# Patient Record
Sex: MALE | Race: WHITE | Employment: FULL TIME | URBAN - METROPOLITAN AREA
[De-identification: names, ages, dates, MRNs, and addresses within clinical notes are randomized per-mention and may not be internally consistent; named-entity substitution may affect disease eponyms.]

---

## 2017-08-26 DIAGNOSIS — Z00.00 PREVENTATIVE HEALTH CARE: ICD-10-CM

## 2017-08-26 LAB
ANION GAP SERPL CALCULATED.3IONS-SCNC: 10 MMOL/L (ref 3–16)
BUN BLDV-MCNC: 20 MG/DL (ref 7–20)
CALCIUM SERPL-MCNC: 9 MG/DL (ref 8.3–10.6)
CHLORIDE BLD-SCNC: 105 MMOL/L (ref 99–110)
CHOLESTEROL, TOTAL: 177 MG/DL (ref 0–199)
CO2: 26 MMOL/L (ref 21–32)
CREAT SERPL-MCNC: 1.1 MG/DL (ref 0.9–1.3)
GFR AFRICAN AMERICAN: >60
GFR NON-AFRICAN AMERICAN: >60
GLUCOSE BLD-MCNC: 86 MG/DL (ref 70–99)
HDLC SERPL-MCNC: 47 MG/DL (ref 40–60)
LDL CHOLESTEROL CALCULATED: 112 MG/DL
POTASSIUM SERPL-SCNC: 4.7 MMOL/L (ref 3.5–5.1)
PROSTATE SPECIFIC ANTIGEN: 1.03 NG/ML (ref 0–4)
SODIUM BLD-SCNC: 141 MMOL/L (ref 136–145)
TRIGL SERPL-MCNC: 92 MG/DL (ref 0–150)
VLDLC SERPL CALC-MCNC: 18 MG/DL

## 2017-09-06 ENCOUNTER — TELEPHONE (OUTPATIENT)
Dept: FAMILY MEDICINE CLINIC | Age: 47
End: 2017-09-06

## 2017-09-06 DIAGNOSIS — E78.00 HYPERCHOLESTEROLEMIA: ICD-10-CM

## 2017-09-06 DIAGNOSIS — Z00.00 ROUTINE GENERAL MEDICAL EXAMINATION AT A HEALTH CARE FACILITY: ICD-10-CM

## 2017-09-06 DIAGNOSIS — Z11.4 SCREENING FOR HIV (HUMAN IMMUNODEFICIENCY VIRUS): Primary | ICD-10-CM

## 2017-09-06 RX ORDER — LISINOPRIL 10 MG/1
TABLET ORAL
Qty: 30 TABLET | Refills: 0 | Status: SHIPPED | OUTPATIENT
Start: 2017-09-06 | End: 2017-09-08 | Stop reason: SDUPTHER

## 2017-09-08 ENCOUNTER — TELEPHONE (OUTPATIENT)
Dept: FAMILY MEDICINE CLINIC | Age: 47
End: 2017-09-08

## 2017-09-08 ENCOUNTER — OFFICE VISIT (OUTPATIENT)
Dept: FAMILY MEDICINE CLINIC | Age: 47
End: 2017-09-08

## 2017-09-08 VITALS
HEIGHT: 69 IN | HEART RATE: 81 BPM | DIASTOLIC BLOOD PRESSURE: 70 MMHG | WEIGHT: 164 LBS | TEMPERATURE: 98.2 F | BODY MASS INDEX: 24.29 KG/M2 | SYSTOLIC BLOOD PRESSURE: 110 MMHG

## 2017-09-08 DIAGNOSIS — Z00.00 PREVENTATIVE HEALTH CARE: Primary | ICD-10-CM

## 2017-09-08 DIAGNOSIS — Z23 NEED FOR INFLUENZA VACCINATION: ICD-10-CM

## 2017-09-08 DIAGNOSIS — E78.5 DYSLIPIDEMIA: ICD-10-CM

## 2017-09-08 DIAGNOSIS — K62.5 BRIGHT RED RECTAL BLEEDING: ICD-10-CM

## 2017-09-08 DIAGNOSIS — I10 ESSENTIAL HYPERTENSION: ICD-10-CM

## 2017-09-08 PROCEDURE — 99396 PREV VISIT EST AGE 40-64: CPT | Performed by: FAMILY MEDICINE

## 2017-09-08 PROCEDURE — 90471 IMMUNIZATION ADMIN: CPT | Performed by: FAMILY MEDICINE

## 2017-09-08 PROCEDURE — 90630 INFLUENZA, QUADV, 18-64 YRS, ID, PF, MICRO INJ, 0.1ML (FLUZONE QUADV, PF): CPT | Performed by: FAMILY MEDICINE

## 2017-09-08 RX ORDER — LISINOPRIL 10 MG/1
TABLET ORAL
Qty: 90 TABLET | Refills: 3 | Status: SHIPPED | OUTPATIENT
Start: 2017-09-08 | End: 2018-11-08 | Stop reason: SDUPTHER

## 2017-09-08 RX ORDER — SERTRALINE HYDROCHLORIDE 100 MG/1
100 TABLET, FILM COATED ORAL DAILY
Qty: 90 TABLET | Refills: 3 | Status: SHIPPED | OUTPATIENT
Start: 2017-09-08 | End: 2018-05-20 | Stop reason: SDUPTHER

## 2017-09-18 ASSESSMENT — ENCOUNTER SYMPTOMS
ROS SKIN COMMENTS: NO SKIN WOUNDS.
BLOOD IN STOOL: 0
SHORTNESS OF BREATH: 0
ABDOMINAL PAIN: 0

## 2017-09-26 ENCOUNTER — TELEPHONE (OUTPATIENT)
Dept: FAMILY MEDICINE CLINIC | Age: 47
End: 2017-09-26

## 2017-10-25 ENCOUNTER — OFFICE VISIT (OUTPATIENT)
Dept: FAMILY MEDICINE CLINIC | Age: 47
End: 2017-10-25

## 2017-10-25 VITALS
HEART RATE: 72 BPM | TEMPERATURE: 98 F | SYSTOLIC BLOOD PRESSURE: 118 MMHG | HEIGHT: 69 IN | BODY MASS INDEX: 25.21 KG/M2 | DIASTOLIC BLOOD PRESSURE: 82 MMHG | WEIGHT: 170.2 LBS

## 2017-10-25 DIAGNOSIS — J02.9 SORE THROAT: Primary | ICD-10-CM

## 2017-10-25 LAB — S PYO AG THROAT QL: NORMAL

## 2017-10-25 PROCEDURE — 87880 STREP A ASSAY W/OPTIC: CPT | Performed by: FAMILY MEDICINE

## 2017-10-25 PROCEDURE — 99213 OFFICE O/P EST LOW 20 MIN: CPT | Performed by: FAMILY MEDICINE

## 2017-10-25 ASSESSMENT — ENCOUNTER SYMPTOMS
VOMITING: 0
DIARRHEA: 0
COUGH: 1
NAUSEA: 0
SHORTNESS OF BREATH: 0
SORE THROAT: 1
ABDOMINAL PAIN: 0
RHINORRHEA: 1

## 2017-10-25 NOTE — PATIENT INSTRUCTIONS
Please call your pharmacy if you need any refills of your medication(s). Please call our office at [phone] if you don't hear from us about your test results. Bring an accurate list of your medications with you at every appointment to ensure that we have the correct information. Our office hours are: Monday - Friday 7 am- 5 pm; Saturdays vary on doctor working.     Phone lines turn on at 8 am.

## 2017-10-25 NOTE — PROGRESS NOTES
Subjective:      Patient ID: Zach Elliott is a 52 y.o. male. HPI     Chief Complaint   Patient presents with    Sinusitis     dry cough, nasal congestion, sinus pressure, headaches intermitted, and sore throat x 1 week. has taken ibu. Pt presents c/o sore throat x 1 week. States he noticed some white spots associated with his sore throat that resolved a few days later. Associated with dry cough, sinus drainage, headache, fatigue, and intermittent ear pain. States he has tried salt water gargles a few times a day and Ibuprofen as needed with some relief. Denies recent sick contacts but states that he works with different people. Pt states he is getting a colonoscopy next Monday and just wanted to be sure he was not sick in case he had to reschedule his colonoscopy. Zach Elliott is a 52 y.o. male with the following history as recorded in [facility]:  Patient Active Problem List    Diagnosis Date Noted    Essential hypertension 09/08/2017    Dyslipidemia 02/06/2014    Hypercholesterolemia 11/07/2011    Hyperglycemia 11/07/2011    Hypertension     Anxiety      Current Outpatient Prescriptions   Medication Sig Dispense Refill    lisinopril (PRINIVIL;ZESTRIL) 10 MG tablet TAKE 1 TABLET BY MOUTH EVERY DAY 90 tablet 3    sertraline (ZOLOFT) 100 MG tablet Take 1 tablet by mouth daily 90 tablet 3     No current facility-administered medications for this visit. Allergies: Review of patient's allergies indicates no known allergies.   Past Medical History:   Diagnosis Date    Anxiety     Hypertension      Past Surgical History:   Procedure Laterality Date    HERNIA REPAIR       Family History   Problem Relation Age of Onset    High Blood Pressure Mother     High Blood Pressure Father      Social History   Substance Use Topics    Smoking status: Never Smoker    Smokeless tobacco: Never Used    Alcohol use No     Vitals:    10/25/17 1137   BP: 118/82   Pulse: 72   Temp: 98 °F (36.7 °C) TempSrc: Oral   Weight: 170 lb 3.2 oz (77.2 kg)   Height: 5' 9\" (1.753 m)     Body mass index is 25.13 kg/m². Wt Readings from Last 3 Encounters:   10/25/17 170 lb 3.2 oz (77.2 kg)   09/08/17 164 lb (74.4 kg)   09/02/16 154 lb (69.9 kg)     BP Readings from Last 3 Encounters:   10/25/17 118/82   09/08/17 110/70   09/02/16 100/66      Review of Systems   Constitutional: Positive for fatigue. Negative for chills and fever. HENT: Positive for congestion, ear pain, rhinorrhea and sore throat. Respiratory: Positive for cough. Negative for shortness of breath. Cardiovascular: Negative for chest pain and palpitations. Gastrointestinal: Negative for abdominal pain, diarrhea, nausea and vomiting. Skin: Negative for rash. Neurological: Positive for headaches. Objective:   Physical Exam   Constitutional: He is oriented to person, place, and time. He appears well-developed and well-nourished. HENT:   Head: Normocephalic and atraumatic. Right Ear: Tympanic membrane, external ear and ear canal normal.   Left Ear: Tympanic membrane, external ear and ear canal normal.   Nose: Right sinus exhibits no maxillary sinus tenderness and no frontal sinus tenderness. Left sinus exhibits no maxillary sinus tenderness and no frontal sinus tenderness. Mouth/Throat: Mucous membranes are normal. No oropharyngeal exudate, posterior oropharyngeal erythema or tonsillar abscesses. Cardiovascular: Normal rate and regular rhythm. Pulmonary/Chest: Effort normal and breath sounds normal.   Abdominal: Soft. Bowel sounds are normal.   Neurological: He is alert and oriented to person, place, and time. Skin: Skin is warm and dry. Nursing note and vitals reviewed.     Results for POC orders placed in visit on 10/25/17   POCT rapid strep A   Result Value Ref Range    Strep A Ag None Detected None Detected         Assessment:      Sore throat - Rapid strep was negative in clinic - will send for culture      Plan: Informed pt to use humidifier, ibuprofen/tylenol PRN, continue salt water gargles, and drink soothing liquids  Informed pt that the office will call with culture results    Orders Placed This Encounter   Procedures    THROAT CULTURE    POCT rapid strep A

## 2017-10-27 LAB — THROAT CULTURE: NORMAL

## 2018-05-23 RX ORDER — SERTRALINE HYDROCHLORIDE 100 MG/1
100 TABLET, FILM COATED ORAL DAILY
Qty: 90 TABLET | Refills: 1 | Status: SHIPPED | OUTPATIENT
Start: 2018-05-23 | End: 2018-11-08 | Stop reason: SDUPTHER

## 2018-08-25 DIAGNOSIS — Z00.00 PREVENTATIVE HEALTH CARE: ICD-10-CM

## 2018-08-25 LAB
ANION GAP SERPL CALCULATED.3IONS-SCNC: 14 MMOL/L (ref 3–16)
BUN BLDV-MCNC: 20 MG/DL (ref 7–20)
CALCIUM SERPL-MCNC: 9.5 MG/DL (ref 8.3–10.6)
CHLORIDE BLD-SCNC: 102 MMOL/L (ref 99–110)
CHOLESTEROL, TOTAL: 213 MG/DL (ref 0–199)
CO2: 22 MMOL/L (ref 21–32)
CREAT SERPL-MCNC: 1.2 MG/DL (ref 0.9–1.3)
GFR AFRICAN AMERICAN: >60
GFR NON-AFRICAN AMERICAN: >60
GLUCOSE BLD-MCNC: 110 MG/DL (ref 70–99)
HDLC SERPL-MCNC: 42 MG/DL (ref 40–60)
LDL CHOLESTEROL CALCULATED: 156 MG/DL
POTASSIUM SERPL-SCNC: 4.7 MMOL/L (ref 3.5–5.1)
SODIUM BLD-SCNC: 138 MMOL/L (ref 136–145)
TRIGL SERPL-MCNC: 73 MG/DL (ref 0–150)
VLDLC SERPL CALC-MCNC: 15 MG/DL

## 2018-08-26 LAB
HIV AG/AB: NORMAL
HIV ANTIGEN: NORMAL
HIV-1 ANTIBODY: NORMAL
HIV-2 AB: NORMAL

## 2018-09-06 DIAGNOSIS — R73.9 HYPERGLYCEMIA: Primary | ICD-10-CM

## 2018-09-22 DIAGNOSIS — R73.9 HYPERGLYCEMIA: ICD-10-CM

## 2018-09-23 LAB
ESTIMATED AVERAGE GLUCOSE: 108.3 MG/DL
HBA1C MFR BLD: 5.4 %

## 2018-11-08 ENCOUNTER — OFFICE VISIT (OUTPATIENT)
Dept: FAMILY MEDICINE CLINIC | Age: 48
End: 2018-11-08
Payer: COMMERCIAL

## 2018-11-08 VITALS
BODY MASS INDEX: 27.02 KG/M2 | DIASTOLIC BLOOD PRESSURE: 70 MMHG | WEIGHT: 182.4 LBS | TEMPERATURE: 98.7 F | SYSTOLIC BLOOD PRESSURE: 110 MMHG | HEIGHT: 69 IN

## 2018-11-08 DIAGNOSIS — L98.9 FACIAL LESION: ICD-10-CM

## 2018-11-08 DIAGNOSIS — Z00.00 PREVENTATIVE HEALTH CARE: Primary | ICD-10-CM

## 2018-11-08 DIAGNOSIS — Z23 NEED FOR INFLUENZA VACCINATION: ICD-10-CM

## 2018-11-08 PROCEDURE — 99212 OFFICE O/P EST SF 10 MIN: CPT | Performed by: FAMILY MEDICINE

## 2018-11-08 PROCEDURE — 90471 IMMUNIZATION ADMIN: CPT | Performed by: FAMILY MEDICINE

## 2018-11-08 PROCEDURE — 90688 IIV4 VACCINE SPLT 0.5 ML IM: CPT | Performed by: FAMILY MEDICINE

## 2018-11-08 PROCEDURE — 99396 PREV VISIT EST AGE 40-64: CPT | Performed by: FAMILY MEDICINE

## 2018-11-08 RX ORDER — LISINOPRIL 10 MG/1
TABLET ORAL
Qty: 90 TABLET | Refills: 3 | Status: SHIPPED | OUTPATIENT
Start: 2018-11-08 | End: 2019-11-07 | Stop reason: SDUPTHER

## 2018-11-08 RX ORDER — SERTRALINE HYDROCHLORIDE 100 MG/1
100 TABLET, FILM COATED ORAL DAILY
Qty: 90 TABLET | Refills: 3 | Status: SHIPPED | OUTPATIENT
Start: 2018-11-08 | End: 2019-08-23

## 2018-11-08 ASSESSMENT — PATIENT HEALTH QUESTIONNAIRE - PHQ9
SUM OF ALL RESPONSES TO PHQ QUESTIONS 1-9: 0
2. FEELING DOWN, DEPRESSED OR HOPELESS: 0
1. LITTLE INTEREST OR PLEASURE IN DOING THINGS: 0
SUM OF ALL RESPONSES TO PHQ QUESTIONS 1-9: 0
SUM OF ALL RESPONSES TO PHQ9 QUESTIONS 1 & 2: 0

## 2018-11-08 ASSESSMENT — ENCOUNTER SYMPTOMS
SHORTNESS OF BREATH: 0
NAUSEA: 0
WHEEZING: 0
BLOOD IN STOOL: 0

## 2018-11-08 NOTE — PROGRESS NOTES
11/8/2018    Roseann Fowler is a 50 y.o. male    Chief Complaint   Patient presents with    Annual Exam     CPE    has a spot on nose       SUBJECTIVE  HPI      Roseann Fowler is a 50year old male who presented to the clinic for his annual physical exam. We discussed labs and a facial lesion on his nose that he noticed 1 year ago. He states that it has not changed, does not bleed, and is not painful. He has not tried anything to make it go away. Review of Systems   Constitutional: Negative for fatigue and fever. Eyes: Negative for visual disturbance. Respiratory: Negative for shortness of breath and wheezing. Cardiovascular: Negative for chest pain. Gastrointestinal: Negative for blood in stool and nausea. Endocrine: Negative for polyuria. Genitourinary: Negative for hematuria. Neurological: Negative for dizziness. Psychiatric/Behavioral: Negative for confusion. Patient Active Problem List   Diagnosis    Hypertension    Anxiety    Hypercholesterolemia    Hyperglycemia    Dyslipidemia    Essential hypertension      Family History   Problem Relation Age of Onset    High Blood Pressure Mother     High Blood Pressure Father      Past Surgical History:   Procedure Laterality Date    HERNIA REPAIR       Social History     Social History    Marital status:      Spouse name: N/A    Number of children: N/A    Years of education: N/A     Occupational History    Not on file. Social History Main Topics    Smoking status: Never Smoker    Smokeless tobacco: Never Used    Alcohol use No    Drug use: No    Sexual activity: Not on file     Other Topics Concern    Not on file     Social History Narrative    No narrative on file         OBJECTIVE  Physical Exam   Constitutional: He is oriented to person, place, and time. He appears well-developed and well-nourished. HENT:   Head: Normocephalic and atraumatic.    Cardiovascular: Normal rate, regular rhythm and normal heart sounds. Pulmonary/Chest: Effort normal and breath sounds normal. He has no wheezes. Abdominal: Soft. Bowel sounds are normal. He exhibits no distension. There is no tenderness. Musculoskeletal: Normal range of motion. Lymphadenopathy:     He has no cervical adenopathy. Neurological: He is alert and oriented to person, place, and time. Skin: Skin is warm. Flat red skin lesion noted on the tip of his nose. Psychiatric: He has a normal mood and affect. The 10-year ASCVD risk score (Bill Hernandez et al., 2013) is: 3.4%    Values used to calculate the score:      Age: 50 years      Sex: Male      Is Non- : No      Diabetic: No      Tobacco smoker: No      Systolic Blood Pressure: 569 mmHg      Is BP treated: Yes      HDL Cholesterol: 42 mg/dL      Total Cholesterol: 213 mg/dL    Allergies  Patient has no known allergies. Current Outpatient Prescriptions   Medication Sig Dispense Refill    sertraline (ZOLOFT) 100 MG tablet Take 1 tablet by mouth daily 90 tablet 3    lisinopril (PRINIVIL;ZESTRIL) 10 MG tablet TAKE 1 TABLET BY MOUTH EVERY DAY 90 tablet 3     No current facility-administered medications for this visit. Vitals:    11/08/18 0709   BP: 110/70   Temp: 98.7 °F (37.1 °C)     Body mass index is 26.94 kg/m². Lab Review   Orders Only on 09/22/2018   Component Date Value    Hemoglobin A1C 09/22/2018 5.4     eAG 09/22/2018 108.3            ASSESSMENT AND PLAN     Diagnosis Orders   1. Preventative health care  Psa screening    Basic Metabolic Panel    Lipid Panel   2. Facial lesion  Ambulatory referral to Dermatology   3. Need for influenza vaccination  INFLUENZA, QUADV, 3 YRS AND OLDER, IM, MDV, 0.5ML ([facility])   4. BMI 26.0-26.9,adult       Dermatology referral for evaluation of nasal lesion. Continue with exercise.   New meds this visit:    Orders Placed This Encounter   Medications    sertraline (ZOLOFT) 100 MG tablet     Sig: Take 1 tablet by mouth daily     Dispense:  90 tablet     Refill:  3    lisinopril (PRINIVIL;ZESTRIL) 10 MG tablet     Sig: TAKE 1 TABLET BY MOUTH EVERY DAY     Dispense:  90 tablet     Refill:  3     PATIENT NEEDS ASAP PLEASE     New orders placed this visit:    Orders Placed This Encounter   Procedures    INFLUENZA, QUADV, 3 YRS AND OLDER, IM, MDV, 0.5ML (FLUZONE QUADV)    Psa screening     Standing Status:   Future     Standing Expiration Date:   2/8/2020    Basic Metabolic Panel     Standing Status:   Future     Standing Expiration Date:   2/8/2020    Lipid Panel     Standing Status:   Future     Standing Expiration Date:   2/8/2020     Order Specific Question:   Is Patient Fasting?/# of Hours     Answer:   12 hours    Ambulatory referral to Dermatology     Referral Priority:   Routine     Referral Type:   Consult for Advice and Opinion     Referral Reason:   Specialty Services Required     Referred to Provider:   Chai Stephens MD     Number of Visits Requested:   1     Return in about 1 year (around 11/8/2019) for 30 minute annual physical.  continue with the following meds:    Outpatient Encounter Prescriptions as of 11/8/2018   Medication Sig Dispense Refill    sertraline (ZOLOFT) 100 MG tablet Take 1 tablet by mouth daily 90 tablet 3    lisinopril (PRINIVIL;ZESTRIL) 10 MG tablet TAKE 1 TABLET BY MOUTH EVERY DAY 90 tablet 3    [DISCONTINUED] sertraline (ZOLOFT) 100 MG tablet TAKE 1 TABLET BY MOUTH DAILY 90 tablet 1    [DISCONTINUED] lisinopril (PRINIVIL;ZESTRIL) 10 MG tablet TAKE 1 TABLET BY MOUTH EVERY DAY 90 tablet 3     No facility-administered encounter medications on file as of 11/8/2018. Flu shot administered. He denies any need for tdap as he does not work outside and isn't at risk of getting any dirty wounds. We discussed that his A1C level was normal and there were no concerns based on his current ASCVD score.      He would like his PSA checked again next year because his father has a history of prostate problems. We talked about his recent weight gain. He stated that he is aware that he has gained some weight and plans on increasing his daily exercise. He will make an appointment with dermatology to evaluate the lesion on his nose.          Alcides Bauer D.O.

## 2018-11-08 NOTE — PATIENT INSTRUCTIONS
Please call your pharmacy if you need any refills of your medication(s). Please call our office at [phone] if you don't hear from us about your test results. Please be sure to call our office if your illness/problem has been treated for but has not completely resolved. Bring an accurate list of your medications with you at every appointment to ensure that we have the correct information. Our office hours are: Monday - Friday 7 am- 5 pm; Saturdays vary on doctor working.     Phone lines turn on at 8 am.

## 2018-11-14 RX ORDER — LISINOPRIL 10 MG/1
TABLET ORAL
Qty: 90 TABLET | Refills: 3 | OUTPATIENT
Start: 2018-11-14

## 2019-01-29 ENCOUNTER — OFFICE VISIT (OUTPATIENT)
Dept: DERMATOLOGY | Age: 49
End: 2019-01-29
Payer: COMMERCIAL

## 2019-01-29 DIAGNOSIS — L57.0 ACTINIC KERATOSIS: Primary | ICD-10-CM

## 2019-01-29 DIAGNOSIS — D22.9 MULTIPLE BENIGN MELANOCYTIC NEVI: ICD-10-CM

## 2019-01-29 DIAGNOSIS — Z12.83 SKIN CANCER SCREENING: ICD-10-CM

## 2019-01-29 PROCEDURE — 17000 DESTRUCT PREMALG LESION: CPT | Performed by: DERMATOLOGY

## 2019-01-29 PROCEDURE — 99203 OFFICE O/P NEW LOW 30 MIN: CPT | Performed by: DERMATOLOGY

## 2019-02-28 PROBLEM — Z12.83 SKIN CANCER SCREENING: Status: RESOLVED | Noted: 2019-01-29 | Resolved: 2019-02-28

## 2019-08-23 ENCOUNTER — OFFICE VISIT (OUTPATIENT)
Dept: FAMILY MEDICINE CLINIC | Age: 49
End: 2019-08-23
Payer: COMMERCIAL

## 2019-08-23 VITALS
TEMPERATURE: 99.6 F | BODY MASS INDEX: 28.02 KG/M2 | SYSTOLIC BLOOD PRESSURE: 110 MMHG | DIASTOLIC BLOOD PRESSURE: 74 MMHG | WEIGHT: 189.2 LBS | HEIGHT: 69 IN

## 2019-08-23 DIAGNOSIS — H65.03 BILATERAL ACUTE SEROUS OTITIS MEDIA, RECURRENCE NOT SPECIFIED: Primary | ICD-10-CM

## 2019-08-23 PROCEDURE — 99213 OFFICE O/P EST LOW 20 MIN: CPT | Performed by: INTERNAL MEDICINE

## 2019-08-23 RX ORDER — CEFUROXIME AXETIL 250 MG/1
250 TABLET ORAL 2 TIMES DAILY
Qty: 20 TABLET | Refills: 0 | Status: SHIPPED | OUTPATIENT
Start: 2019-08-23 | End: 2019-09-02

## 2019-08-23 ASSESSMENT — PATIENT HEALTH QUESTIONNAIRE - PHQ9
1. LITTLE INTEREST OR PLEASURE IN DOING THINGS: 0
SUM OF ALL RESPONSES TO PHQ QUESTIONS 1-9: 0
SUM OF ALL RESPONSES TO PHQ9 QUESTIONS 1 & 2: 0
SUM OF ALL RESPONSES TO PHQ QUESTIONS 1-9: 0
2. FEELING DOWN, DEPRESSED OR HOPELESS: 0

## 2019-08-23 ASSESSMENT — ENCOUNTER SYMPTOMS
SHORTNESS OF BREATH: 0
ANAL BLEEDING: 0
APNEA: 0
CONSTIPATION: 0
ABDOMINAL PAIN: 0
COUGH: 0
BLOOD IN STOOL: 0

## 2019-09-28 DIAGNOSIS — Z00.00 PREVENTATIVE HEALTH CARE: ICD-10-CM

## 2019-09-28 LAB
ANION GAP SERPL CALCULATED.3IONS-SCNC: 10 MMOL/L (ref 3–16)
BUN BLDV-MCNC: 17 MG/DL (ref 7–20)
CALCIUM SERPL-MCNC: 9 MG/DL (ref 8.3–10.6)
CHLORIDE BLD-SCNC: 105 MMOL/L (ref 99–110)
CHOLESTEROL, TOTAL: 180 MG/DL (ref 0–199)
CO2: 25 MMOL/L (ref 21–32)
CREAT SERPL-MCNC: 1.1 MG/DL (ref 0.9–1.3)
GFR AFRICAN AMERICAN: >60
GFR NON-AFRICAN AMERICAN: >60
GLUCOSE BLD-MCNC: 92 MG/DL (ref 70–99)
HDLC SERPL-MCNC: 39 MG/DL (ref 40–60)
LDL CHOLESTEROL CALCULATED: 120 MG/DL
POTASSIUM SERPL-SCNC: 4.3 MMOL/L (ref 3.5–5.1)
PROSTATE SPECIFIC ANTIGEN: 1.09 NG/ML (ref 0–4)
SODIUM BLD-SCNC: 140 MMOL/L (ref 136–145)
TRIGL SERPL-MCNC: 104 MG/DL (ref 0–150)
VLDLC SERPL CALC-MCNC: 21 MG/DL

## 2019-11-07 ENCOUNTER — OFFICE VISIT (OUTPATIENT)
Dept: FAMILY MEDICINE CLINIC | Age: 49
End: 2019-11-07
Payer: COMMERCIAL

## 2019-11-07 VITALS
DIASTOLIC BLOOD PRESSURE: 80 MMHG | BODY MASS INDEX: 28.44 KG/M2 | WEIGHT: 192 LBS | HEIGHT: 69 IN | SYSTOLIC BLOOD PRESSURE: 120 MMHG | TEMPERATURE: 98.3 F

## 2019-11-07 DIAGNOSIS — Z00.00 PREVENTATIVE HEALTH CARE: Primary | ICD-10-CM

## 2019-11-07 DIAGNOSIS — Z23 NEED FOR INFLUENZA VACCINATION: ICD-10-CM

## 2019-11-07 DIAGNOSIS — R06.83 LOUD SNORING: ICD-10-CM

## 2019-11-07 DIAGNOSIS — E78.00 HYPERCHOLESTEROLEMIA: ICD-10-CM

## 2019-11-07 DIAGNOSIS — G47.20 NIGHT-WAKING DISORDER: ICD-10-CM

## 2019-11-07 DIAGNOSIS — I10 ESSENTIAL HYPERTENSION: ICD-10-CM

## 2019-11-07 DIAGNOSIS — Z23 NEEDS FLU SHOT: ICD-10-CM

## 2019-11-07 PROCEDURE — 90686 IIV4 VACC NO PRSV 0.5 ML IM: CPT | Performed by: FAMILY MEDICINE

## 2019-11-07 PROCEDURE — 90471 IMMUNIZATION ADMIN: CPT | Performed by: FAMILY MEDICINE

## 2019-11-07 PROCEDURE — 99396 PREV VISIT EST AGE 40-64: CPT | Performed by: FAMILY MEDICINE

## 2019-11-07 RX ORDER — LISINOPRIL 10 MG/1
TABLET ORAL
Qty: 90 TABLET | Refills: 3 | Status: SHIPPED | OUTPATIENT
Start: 2019-11-07 | End: 2020-07-16

## 2019-11-07 ASSESSMENT — ENCOUNTER SYMPTOMS
CHEST TIGHTNESS: 0
CONSTIPATION: 0
BLOOD IN STOOL: 0
COUGH: 1
DIARRHEA: 0

## 2020-01-09 ENCOUNTER — OFFICE VISIT (OUTPATIENT)
Dept: PULMONOLOGY | Age: 50
End: 2020-01-09
Payer: COMMERCIAL

## 2020-01-09 VITALS
HEIGHT: 69 IN | DIASTOLIC BLOOD PRESSURE: 80 MMHG | WEIGHT: 186 LBS | HEART RATE: 75 BPM | SYSTOLIC BLOOD PRESSURE: 110 MMHG | BODY MASS INDEX: 27.55 KG/M2 | OXYGEN SATURATION: 98 %

## 2020-01-09 PROBLEM — I10 ESSENTIAL HYPERTENSION: Chronic | Status: ACTIVE | Noted: 2017-09-08

## 2020-01-09 PROCEDURE — 99244 OFF/OP CNSLTJ NEW/EST MOD 40: CPT | Performed by: INTERNAL MEDICINE

## 2020-01-09 ASSESSMENT — ENCOUNTER SYMPTOMS
PHOTOPHOBIA: 0
COUGH: 1
ALLERGIC/IMMUNOLOGIC NEGATIVE: 1
VOMITING: 0
EYE PAIN: 0
RHINORRHEA: 0
ABDOMINAL PAIN: 0
ABDOMINAL DISTENTION: 0
APNEA: 0
NAUSEA: 0
SHORTNESS OF BREATH: 0
CHEST TIGHTNESS: 0

## 2020-01-09 ASSESSMENT — SLEEP AND FATIGUE QUESTIONNAIRES
HOW LIKELY ARE YOU TO NOD OFF OR FALL ASLEEP WHILE SITTING QUIETLY AFTER LUNCH WITHOUT ALCOHOL: 1
HOW LIKELY ARE YOU TO NOD OFF OR FALL ASLEEP WHILE SITTING AND READING: 2
NECK CIRCUMFERENCE (INCHES): 16
HOW LIKELY ARE YOU TO NOD OFF OR FALL ASLEEP WHEN YOU ARE A PASSENGER IN A CAR FOR AN HOUR WITHOUT A BREAK: 0
HOW LIKELY ARE YOU TO NOD OFF OR FALL ASLEEP IN A CAR, WHILE STOPPED FOR A FEW MINUTES IN TRAFFIC: 0
HOW LIKELY ARE YOU TO NOD OFF OR FALL ASLEEP WHILE SITTING AND TALKING TO SOMEONE: 0
HOW LIKELY ARE YOU TO NOD OFF OR FALL ASLEEP WHILE LYING DOWN TO REST IN THE AFTERNOON WHEN CIRCUMSTANCES PERMIT: 1
ESS TOTAL SCORE: 6
HOW LIKELY ARE YOU TO NOD OFF OR FALL ASLEEP WHILE WATCHING TV: 2
HOW LIKELY ARE YOU TO NOD OFF OR FALL ASLEEP WHILE SITTING INACTIVE IN A PUBLIC PLACE: 0

## 2020-01-09 NOTE — PROGRESS NOTES
night.  He denies: cataplexy and hypnagogic hallucinations. Symptoms began 6 years ago, gradually worsening since that time. HTN: stable on meds and followed by pt's PCP and other physicians. Previous evaluation and treatment has included- none    DOT/CDL - No  FAA/'s license -No    Previous Report(s) Reviewed: historical medical records, office notes, andreferral letter(s). Pertinent data has been documented.     [place] - Total score: 6    Caffeine Intake - Coffee: 2 cup(s) per day    Social History     Socioeconomic History    Marital status:      Spouse name: Not on file    Number of children: Not on file    Years of education: Not on file    Highest education level: Not on file   Occupational History    Not on file   Social Needs    Financial resource strain: Not on file    Food insecurity:     Worry: Not on file     Inability: Not on file    Transportation needs:     Medical: Not on file     Non-medical: Not on file   Tobacco Use    Smoking status: Never Smoker    Smokeless tobacco: Never Used   Substance and Sexual Activity    Alcohol use: No    Drug use: No    Sexual activity: Not on file   Lifestyle    Physical activity:     Days per week: Not on file     Minutes per session: Not on file    Stress: Not on file   Relationships    Social connections:     Talks on phone: Not on file     Gets together: Not on file     Attends Islam service: Not on file     Active member of club or organization: Not on file     Attends meetings of clubs or organizations: Not on file     Relationship status: Not on file    Intimate partner violence:     Fear of current or ex partner: Not on file     Emotionally abused: Not on file     Physically abused: Not on file     Forced sexual activity: Not on file   Other Topics Concern    Not on file   Social History Narrative    Not on file        Current Outpatient Medications   Medication Sig Dispense Refill    lisinopril (PRINIVIL;ZESTRIL) 10 MG tablet TAKE 1 TABLET BY MOUTH EVERY DAY 90 tablet 3     No current facility-administered medications for this visit. Allergies as of 01/09/2020    (No Known Allergies)       Patient Active Problem List   Diagnosis    Anxiety    Hypercholesterolemia    Hyperglycemia    Dyslipidemia    Essential hypertension    Actinic keratosis    Multiple benign melanocytic nevi       Past Medical History:   Diagnosis Date    Anxiety     Hypertension        Past Surgical History:   Procedure Laterality Date    HERNIA REPAIR         Family History   Problem Relation Age of Onset    High Blood Pressure Mother     High Blood Pressure Father        Review of Systems   Constitutional: Positive for fatigue. Negative for activity change and appetite change. HENT: Positive for congestion. Negative for nosebleeds, postnasal drip, rhinorrhea and sneezing. Eyes: Negative for photophobia, pain and visual disturbance. Respiratory: Positive for cough. Negative for apnea, chest tightness and shortness of breath. Cardiovascular: Negative. Gastrointestinal: Negative for abdominal distention, abdominal pain, nausea and vomiting. Endocrine: Negative for cold intolerance and heat intolerance. Genitourinary: Negative for difficulty urinating, dysuria, frequency and urgency. Musculoskeletal: Negative. Negative for neck pain and neck stiffness. Skin: Negative. Allergic/Immunologic: Negative. Neurological: Negative for tremors, seizures, syncope and weakness. Hematological: Negative for adenopathy. Does not bruise/bleed easily. Psychiatric/Behavioral: Positive for sleep disturbance. Negative for agitation, behavioral problems and confusion. Objective:     Vitals:  Weight BMI   Wt Readings from Last 3 Encounters:   01/09/20 186 lb (84.4 kg)   11/07/19 192 lb (87.1 kg)   08/23/19 189 lb 3.2 oz (85.8 kg)    Body mass index is 27.47 kg/m².      BP HR SaO2   BP Readings from Last 3 Encounters: tonsillar adenopathy. Skin:     General: Skin is warm and dry. Nails: There is no clubbing. Neurological:      Mental Status: He is alert and oriented to person, place, and time. Motor: No tremor or seizure activity. Psychiatric:         Speech: Speech normal.         Behavior: Behavior normal.         Thought Content:  Thought content normal.         Judgment: Judgment normal.         Electronically signed by Leif Amezquita MD on1/9/2020 at 10:05 AM

## 2020-01-09 NOTE — LETTER
[facility]  [place] [id number] [place] [address]  Phone: [phone]  Fax: [phone]      January 9, 2020       Patient: Grover Bañuelos   MR Number: [id number]   YOB: 1970   Date of Visit: 1/9/2020     Thank you for allowing me to participate in the care of Grover Bañuelos. Here is my assessment and plan. Also attached is a copy of his consult note:    ASSESSMENT:  Visit Diagnoses and Associated Orders     Hypersomnia   (New Problem)  -  Primary    needs work-up    Home Sleep Study (HST) [[id number] Custom]   - Future Order         Snoring   (New Problem)      needs work-up    Home Sleep Study (HST) [id number] Custom]   - Future Order         Essential hypertension   (Stable)                 Plan:  Differential diagnosis includes but not limited to: ROMA, PLMD's, narcolepsy, parasomnias. Reviewed ROMA (highest likelihood Dx): pathophysiology, diagnosis, complications and treatment. Instructed him not to drive if drowsy. Continue medications per her PCP and other physicians. Limit caffeine use after 3pm. Standard of care is to do in-lab PSG but insurance is mandating an inferior HST. 1 wk follow up after study to review his results. The chronic medical conditions listed are directly related to the primary diagnosis listed above. The management of the primary diagnosis affects the secondary diagnosis and vice versa. Continue meds for: HTN. Pt would medically benefit from wt loss for ROMA (diet, exercise, surgical). Orders Placed This Encounter   Procedures    Home Sleep Study (HST)         If you have questions or concerns, please do not hesitate to call me. I look forward to following Dillon Oats along with you.     Sincerely,      Kevon Fisher MD    CC providers:  Malathi Ortiz, [address] [place] [phone]  VIA In Basket

## 2020-01-23 ENCOUNTER — HOSPITAL ENCOUNTER (OUTPATIENT)
Dept: SLEEP CENTER | Age: 50
Discharge: HOME | End: 2020-01-23
Payer: COMMERCIAL

## 2020-01-23 PROCEDURE — 95806 SLEEP STUDY UNATT&RESP EFFT: CPT

## 2020-01-28 PROCEDURE — 95806 SLEEP STUDY UNATT&RESP EFFT: CPT | Performed by: INTERNAL MEDICINE

## 2020-01-29 ENCOUNTER — TELEPHONE (OUTPATIENT)
Dept: PULMONOLOGY | Age: 50
End: 2020-01-29

## 2020-02-03 ENCOUNTER — TELEPHONE (OUTPATIENT)
Dept: PULMONOLOGY | Age: 50
End: 2020-02-03

## 2020-04-28 ENCOUNTER — TELEPHONE (OUTPATIENT)
Dept: PULMONOLOGY | Age: 50
End: 2020-04-28

## 2020-05-01 ENCOUNTER — VIRTUAL VISIT (OUTPATIENT)
Dept: PULMONOLOGY | Age: 50
End: 2020-05-01
Payer: COMMERCIAL

## 2020-05-01 PROCEDURE — 99214 OFFICE O/P EST MOD 30 MIN: CPT | Performed by: NURSE PRACTITIONER

## 2020-05-01 ASSESSMENT — SLEEP AND FATIGUE QUESTIONNAIRES
HOW LIKELY ARE YOU TO NOD OFF OR FALL ASLEEP WHILE SITTING QUIETLY AFTER LUNCH WITHOUT ALCOHOL: 0
HOW LIKELY ARE YOU TO NOD OFF OR FALL ASLEEP WHEN YOU ARE A PASSENGER IN A CAR FOR AN HOUR WITHOUT A BREAK: 0
HOW LIKELY ARE YOU TO NOD OFF OR FALL ASLEEP WHILE SITTING INACTIVE IN A PUBLIC PLACE: 0
ESS TOTAL SCORE: 2
HOW LIKELY ARE YOU TO NOD OFF OR FALL ASLEEP WHILE LYING DOWN TO REST IN THE AFTERNOON WHEN CIRCUMSTANCES PERMIT: 0
HOW LIKELY ARE YOU TO NOD OFF OR FALL ASLEEP WHILE SITTING AND READING: 1
HOW LIKELY ARE YOU TO NOD OFF OR FALL ASLEEP WHILE WATCHING TV: 1
HOW LIKELY ARE YOU TO NOD OFF OR FALL ASLEEP WHILE SITTING AND TALKING TO SOMEONE: 0
HOW LIKELY ARE YOU TO NOD OFF OR FALL ASLEEP IN A CAR, WHILE STOPPED FOR A FEW MINUTES IN TRAFFIC: 0

## 2020-05-01 NOTE — PROGRESS NOTES
No     Pressure is comfortable with inspiration and expiration  [x] Yes  [] No     Noticed changes in pressure   [] Yes  [] No  [x] NA   Mask is fitting well  [x] Yes  [] No   Noting Mask Air Leak  [x] Yes  [] No   Having painful Aerophagia  [] Yes  [x] No   Nocturia   1  per night. Having  HA upon waking  [] Yes  [x] No   Dry mouth upon waking   Dry Nose  Dry Eyes  [x] Yes  [] No   Congestion upon waking   [] Yes  [x] No    Nose Bleeds  [] Yes  [x] No   Using Sleep Aides    [x] NA   Understands how to change humidification and/or tubing temperature for comfort while at home  [] Yes  [x] No     Difficulties falling asleep  [] Yes  [x] No   Difficulties staying asleep  [] Yes  [x] No   Approximate time to bed  10:30pm   Approximate wake time  7:30am   Taking Naps  no   If taking naps usual length    [x] NA   If taking naps using the machine  [] Yes  [] No  [x] NA [] With and With out    Drowsy when driving  [] Yes  [x] No     Does patient carry a DOT/CDL  [] Yes  [x] No     Does patient carry FAA/[employer] License   [] Yes  [x] No      Any concerns noted with the machine at this time  [] Yes  [x] No        Diagnosis Orders   1. Obstructive sleep apnea (adult) (pediatric)     2. Essential hypertension         The chronic medical conditions listed are directly related to the primary diagnosis listed above. The management of the primary diagnosis affects the secondary diagnosis and vice versa. Assessment/Plan:     Essential hypertension  Chronic- Stable. Cont meds per PCP and other physicians. Obstructive sleep apnea (adult) (pediatric)  Reviewed compliance download with pt. Supplies and parts as needed for his machine. These are medically necessary. Continue medications per his PCP and other physicians. Limit caffeine use after 3pm.  Encouraged him to work on weight loss through diet and exercise. The encounter diagnosis was Essential hypertension.   The chronic medical conditions listed are directly

## 2020-06-17 ENCOUNTER — TELEPHONE (OUTPATIENT)
Dept: FAMILY MEDICINE CLINIC | Age: 50
End: 2020-06-17

## 2020-07-16 ENCOUNTER — OFFICE VISIT (OUTPATIENT)
Dept: FAMILY MEDICINE CLINIC | Age: 50
End: 2020-07-16
Payer: COMMERCIAL

## 2020-07-16 VITALS
TEMPERATURE: 97.9 F | BODY MASS INDEX: 27.7 KG/M2 | HEART RATE: 88 BPM | SYSTOLIC BLOOD PRESSURE: 134 MMHG | DIASTOLIC BLOOD PRESSURE: 84 MMHG | HEIGHT: 69 IN | WEIGHT: 187 LBS

## 2020-07-16 PROCEDURE — 99213 OFFICE O/P EST LOW 20 MIN: CPT | Performed by: FAMILY MEDICINE

## 2020-07-16 RX ORDER — AMLODIPINE BESYLATE 2.5 MG/1
2.5 TABLET ORAL DAILY
Qty: 30 TABLET | Refills: 3 | Status: SHIPPED | OUTPATIENT
Start: 2020-07-16 | End: 2020-11-16

## 2020-07-16 ASSESSMENT — PATIENT HEALTH QUESTIONNAIRE - PHQ9
2. FEELING DOWN, DEPRESSED OR HOPELESS: 0
SUM OF ALL RESPONSES TO PHQ QUESTIONS 1-9: 0
SUM OF ALL RESPONSES TO PHQ QUESTIONS 1-9: 0
1. LITTLE INTEREST OR PLEASURE IN DOING THINGS: 0
SUM OF ALL RESPONSES TO PHQ9 QUESTIONS 1 & 2: 0

## 2020-07-16 NOTE — PATIENT INSTRUCTIONS
Stop the medicine  Start the new medicine for the blood pressure  The lesion is benign on the back and called a seborrheic keratosis  See me to check the blood pressure on the new medicine in about 2 months

## 2020-07-16 NOTE — PROGRESS NOTES
Subjective:      Patient ID: Ryan Courtney is a 48 y.o. male. Patient presents with:  Cough: due to Lisinopril - discuss allergies  Mole: on back  Check-Up    Cough. Really since being on the med and he just tolerated it  Dry   No sore throat no hemoptysis  No fever  No cp no sob  No tobacco use    No family hx of skin ca    He has a mole on the back. Does not know how long no itch no bleed  He just happened to note     YOB: 1970    Date of Visit:  7/16/2020    No Known Allergies    Current Outpatient Medications:  lisinopril (PRINIVIL;ZESTRIL) 10 MG tablet, TAKE 1 TABLET BY MOUTH EVERY DAY, Disp: 90 tablet, Rfl: 3    No current facility-administered medications for this visit.       ---------------------------               07/16/20                      0943         ---------------------------   BP:          134/84         Site:    Left Upper Arm     Position:     Sitting        Cuff Size:  Medium Adult      Pulse:         88           Temp:   97.9 °F (36.6 °C)   TempSrc:    Temporal        Weight: 187 lb (84.8 kg)    Height:  5' 9\" (1.753 m)   ---------------------------  Body mass index is 27.62 kg/m². Wt Readings from Last 3 Encounters:  07/16/20 : 187 lb (84.8 kg)  01/09/20 : 186 lb (84.4 kg)  11/07/19 : 192 lb (87.1 kg)    BP Readings from Last 3 Encounters:  07/16/20 : 134/84  01/09/20 : 110/80  11/07/19 : 120/80                Review of Systems    Objective:   Physical Exam  Constitutional:       General: He is not in acute distress. Appearance: He is well-developed. He is not diaphoretic. HENT:      Head: Normocephalic and atraumatic. Eyes:      General: No scleral icterus. Neck:      Musculoskeletal: Neck supple. Thyroid: No thyroid mass or thyromegaly. Cardiovascular:      Rate and Rhythm: Normal rate and regular rhythm. Heart sounds: Normal heart sounds. No murmur. No friction rub. No gallop.     Pulmonary:      Effort: Pulmonary effort is normal. No tachypnea, accessory muscle usage or respiratory distress. Breath sounds: Normal breath sounds. No decreased breath sounds, wheezing, rhonchi or rales. Lymphadenopathy:      Cervical: No cervical adenopathy. Upper Body:      Right upper body: No supraclavicular adenopathy. Left upper body: No supraclavicular adenopathy. Skin:     General: Skin is warm and dry. Coloration: Skin is not pale. Neurological:      Mental Status: He is alert. Assessment:        Diagnosis Orders   1. Essential hypertension     2. Seborrheic keratoses     3.  Cough  XR CHEST STANDARD (2 VW)     Orders Placed This Encounter   Medications    amLODIPine (NORVASC) 2.5 MG tablet     Sig: Take 1 tablet by mouth daily     Dispense:  30 tablet     Refill:  3     Sent picture of lesion to the media tab      Plan:      Stop the medicine  Start the new medicine for the blood pressure  The lesion is benign on the back and called a seborrheic keratosis  See me to check the blood pressure on the new medicine in about 2 months         Ko Mcgee MD

## 2020-07-22 ENCOUNTER — HOSPITAL ENCOUNTER (OUTPATIENT)
Age: 50
Discharge: HOME | End: 2020-07-22
Payer: COMMERCIAL

## 2020-07-22 ENCOUNTER — HOSPITAL ENCOUNTER (OUTPATIENT)
Dept: GENERAL RADIOLOGY | Age: 50
Discharge: HOME | End: 2020-07-22
Payer: COMMERCIAL

## 2020-07-22 PROCEDURE — 71046 X-RAY EXAM CHEST 2 VIEWS: CPT

## 2020-08-12 ENCOUNTER — TELEPHONE (OUTPATIENT)
Dept: PULMONOLOGY | Age: 50
End: 2020-08-12

## 2020-09-02 ENCOUNTER — TELEPHONE (OUTPATIENT)
Dept: SLEEP CENTER | Age: 50
End: 2020-09-02

## 2020-09-17 ENCOUNTER — OFFICE VISIT (OUTPATIENT)
Dept: FAMILY MEDICINE CLINIC | Age: 50
End: 2020-09-17
Payer: COMMERCIAL

## 2020-09-17 VITALS
WEIGHT: 180 LBS | HEIGHT: 69 IN | BODY MASS INDEX: 26.66 KG/M2 | TEMPERATURE: 98.1 F | SYSTOLIC BLOOD PRESSURE: 116 MMHG | DIASTOLIC BLOOD PRESSURE: 82 MMHG | HEART RATE: 92 BPM

## 2020-09-17 PROCEDURE — 99213 OFFICE O/P EST LOW 20 MIN: CPT | Performed by: FAMILY MEDICINE

## 2020-09-17 RX ORDER — AMOXICILLIN 500 MG/1
500 CAPSULE ORAL 3 TIMES DAILY
Qty: 30 CAPSULE | Refills: 0 | Status: SHIPPED | OUTPATIENT
Start: 2020-09-17 | End: 2020-09-27

## 2020-09-17 NOTE — PROGRESS NOTES
Subjective:      Patient ID: Dari Abarca is a 48 y.o. male. Patient presents with: Follow-up: hypertension    Here for recheck  The cough had subsided and almost completely went away  The medicine he thinks has helped    He has a hx of allergies and he notes that some cough has started again in the 2-4 weeks  Cough is productive no fever no chill  Really feels well  He notes some pnd and also sinus congestion that he did not have before   No sore throat no ear pain   No sinus pain no facial pain     No exp  No tobacco    YOB: 1970    Date of Visit:  9/17/2020    No Known Allergies    Current Outpatient Medications:  amLODIPine (NORVASC) 2.5 MG tablet, Take 1 tablet by mouth daily, Disp: 30 tablet, Rfl: 3    No current facility-administered medications for this visit.       ---------------------------               09/17/20                      0924         ---------------------------   BP:          116/82         Site:    Left Upper Arm     Position:     Sitting        Cuff Size:  Medium Adult      Pulse:         92           Temp:   98.1 °F (36.7 °C)   TempSrc:    Temporal        Weight: 180 lb (81.6 kg)    Height:  5' 9\" (1.753 m)   ---------------------------  Body mass index is 26.58 kg/m². Wt Readings from Last 3 Encounters:  He has been trying to lose weight and has made diet changes   09/17/20 : 180 lb (81.6 kg)  07/16/20 : 187 lb (84.8 kg)  01/09/20 : 186 lb (84.4 kg)    BP Readings from Last 3 Encounters:  09/17/20 : 116/82  07/16/20 : 134/84  01/09/20 : 110/80                  Review of Systems    Objective:   Physical Exam  Constitutional:       General: He is not in acute distress. Appearance: Normal appearance. He is well-developed. He is not ill-appearing or diaphoretic. HENT:      Head: Normocephalic and atraumatic.       Right Ear: Tympanic membrane and ear canal normal.      Left Ear: Tympanic membrane and ear canal normal. Nose:      Right Turbinates: Swollen. Right Sinus: Maxillary sinus tenderness present. Left Sinus: Maxillary sinus tenderness present. Comments: He has swelling of the nares R>L     Mouth/Throat:      Lips: Pink. Mouth: Mucous membranes are moist. No oral lesions. Pharynx: Oropharynx is clear. Uvula midline. Neck:      Musculoskeletal: Neck supple. Cardiovascular:      Rate and Rhythm: Normal rate and regular rhythm. Heart sounds: Normal heart sounds. No murmur. No friction rub. No gallop. Pulmonary:      Effort: Pulmonary effort is normal. No tachypnea, accessory muscle usage or respiratory distress. Breath sounds: Normal breath sounds. No decreased breath sounds, wheezing, rhonchi or rales. Lymphadenopathy:      Head:      Right side of head: No submental, submandibular, preauricular or posterior auricular adenopathy. Left side of head: No submental, submandibular, preauricular or posterior auricular adenopathy. Cervical: No cervical adenopathy. Upper Body:      Right upper body: No supraclavicular adenopathy. Left upper body: No supraclavicular adenopathy. Skin:     General: Skin is warm and dry. Coloration: Skin is not pale. Neurological:      General: No focal deficit present. Mental Status: He is alert. Assessment:        Diagnosis Orders   1. Essential hypertension     2.  Cough       Orders Placed This Encounter   Medications    amoxicillin (AMOXIL) 500 MG capsule     Sig: Take 1 capsule by mouth 3 times daily for 10 days     Dispense:  30 capsule     Refill:  0     Could very well be sinus sx   He does think the medicne change made large difference for him   He had a colon done on 10/30/17 dr Lcua Overton and diverticulosis recheck in 5 years  He uses CPAP machine   He is seeing me in the next few months for the well check       Plan:      Use the antibiotic  Use over the counter flonase nasal spray   call if this not resolved           Maxime Dillard MD

## 2020-09-28 ENCOUNTER — OFFICE VISIT (OUTPATIENT)
Dept: PRIMARY CARE CLINIC | Age: 50
End: 2020-09-28
Payer: COMMERCIAL

## 2020-09-28 PROCEDURE — 99211 OFF/OP EST MAY X REQ PHY/QHP: CPT | Performed by: NURSE PRACTITIONER

## 2020-09-28 NOTE — PATIENT INSTRUCTIONS

## 2020-09-28 NOTE — PROGRESS NOTES
Kassi Giles received a viral test for COVID-19. They were educated on isolation and quarantine as appropriate. For any symptoms, they were directed to seek care from their PCP, given contact information to establish with a doctor, directed to an urgent care or the emergency room.

## 2020-09-29 LAB — SARS-COV-2, NAA: NOT DETECTED

## 2020-10-02 ENCOUNTER — HOSPITAL ENCOUNTER (OUTPATIENT)
Dept: SLEEP CENTER | Age: 50
Discharge: HOME | End: 2020-10-02
Payer: COMMERCIAL

## 2020-10-02 PROCEDURE — 95811 POLYSOM 6/>YRS CPAP 4/> PARM: CPT | Performed by: INTERNAL MEDICINE

## 2020-10-02 PROCEDURE — 95811 POLYSOM 6/>YRS CPAP 4/> PARM: CPT

## 2020-10-07 ENCOUNTER — TELEPHONE (OUTPATIENT)
Dept: PULMONOLOGY | Age: 50
End: 2020-10-07

## 2020-10-08 ENCOUNTER — TELEPHONE (OUTPATIENT)
Dept: PULMONOLOGY | Age: 50
End: 2020-10-08

## 2020-10-12 ENCOUNTER — TELEPHONE (OUTPATIENT)
Dept: PULMONOLOGY | Age: 50
End: 2020-10-12

## 2020-10-13 ENCOUNTER — TELEPHONE (OUTPATIENT)
Dept: FAMILY MEDICINE CLINIC | Age: 50
End: 2020-10-13

## 2020-10-13 ENCOUNTER — TELEPHONE (OUTPATIENT)
Dept: PULMONOLOGY | Age: 50
End: 2020-10-13

## 2020-10-13 NOTE — TELEPHONE ENCOUNTER
Pt's PCP office called with ne number for pt. Spoke with pt to reivew recent titration study. Order to be sent to [facility] which is the DME he is currently using. Pt has questions about billing and Luis Alberto from [facility] will be notified to connect with pt. Pt is not satisfied with current mask fit. RT at DME  Will  be asked to refit mask at an in office appt.

## 2020-10-13 NOTE — TELEPHONE ENCOUNTER
----- Message from Crispin Griffin sent at 10/13/2020 10:35 AM EDT -----  Subject: Results Request    QUESTIONS  Which lab or imaging result is the patient calling about? Sleep study  Which provider ordered the test? Era Marshall   At what location was the test performed? Date the test was performed? 2020-10-02  Additional Information for Provider? Pt has been trying to use the CPAP   machine and it has not been working. Pt is trying to get ahold of Bill Been   and has been trying to get ahold of her since Thursday 10/8/20  ---------------------------------------------------------------------------  --------------  David Hinders INFO  What is the best way for the office to contact you? OK to leave message on   voicemail  Preferred Call Back Phone Number?  [phone]

## 2020-10-13 NOTE — PROGRESS NOTES
condition.     Mario López MD      NPI: [id number]       Order Signed Date: 10/13/20    Electronically signed by Mario López MD on 10/13/2020 at 11:45 AM

## 2020-10-13 NOTE — TELEPHONE ENCOUNTER
Pt has been trying to get the sleep study results, told pt that Dr. David Rincon didn't order the sleep study that he would have to get Dr. Denae Jacobo office. I called Genia Altamirano, she has been trying to get a hold of the pt - left numerous phone messages, Roro Hopper will contact pt today with results.

## 2020-11-09 ENCOUNTER — OFFICE VISIT (OUTPATIENT)
Dept: FAMILY MEDICINE CLINIC | Age: 50
End: 2020-11-09
Payer: COMMERCIAL

## 2020-11-09 VITALS
SYSTOLIC BLOOD PRESSURE: 122 MMHG | DIASTOLIC BLOOD PRESSURE: 80 MMHG | TEMPERATURE: 98.1 F | HEIGHT: 69 IN | BODY MASS INDEX: 27.11 KG/M2 | HEART RATE: 72 BPM | WEIGHT: 183 LBS

## 2020-11-09 PROCEDURE — 90686 IIV4 VACC NO PRSV 0.5 ML IM: CPT | Performed by: FAMILY MEDICINE

## 2020-11-09 PROCEDURE — 90471 IMMUNIZATION ADMIN: CPT | Performed by: FAMILY MEDICINE

## 2020-11-09 PROCEDURE — 99396 PREV VISIT EST AGE 40-64: CPT | Performed by: FAMILY MEDICINE

## 2020-11-09 ASSESSMENT — ENCOUNTER SYMPTOMS
BACK PAIN: 0
VOICE CHANGE: 0
BLOOD IN STOOL: 0
EYE PAIN: 0
COUGH: 0
VOMITING: 0
CONSTIPATION: 0
SORE THROAT: 0
ROS SKIN COMMENTS: NO SKIN CHANGE
ABDOMINAL PAIN: 0
CHEST TIGHTNESS: 0
SHORTNESS OF BREATH: 0
ABDOMINAL DISTENTION: 0
TROUBLE SWALLOWING: 0
DIARRHEA: 0
NAUSEA: 0

## 2020-11-09 NOTE — PROGRESS NOTES
Subjective:      Patient ID: Jessica Escalante is a 48 y.o. male. Patient presents with: Annual Exam: pt is fasting    Here for the above  She is with out c/o  Feeling well with the bp meds  Still using a cpap   He is exercising     YOB: 1970    Date of Visit:  11/9/2020    No Known Allergies    Current Outpatient Medications:  amLODIPine (NORVASC) 2.5 MG tablet, Take 1 tablet by mouth daily, Disp: 30 tablet, Rfl: 3    No current facility-administered medications for this visit.       ---------------------------               11/09/20                      1044         ---------------------------   BP:          122/80         Site:    Left Upper Arm     Position:     Sitting        Cuff Size:   Large Adult      Pulse:         72           Temp:   98.1 °F (36.7 °C)   TempSrc:    Temporal        Weight:  183 lb (83 kg)     Height:  5' 9\" (1.753 m)   ---------------------------  Body mass index is 27.02 kg/m². Wt Readings from Last 3 Encounters:  11/09/20 : 183 lb (83 kg)  09/17/20 : 180 lb (81.6 kg)  07/16/20 : 187 lb (84.8 kg)    BP Readings from Last 3 Encounters:  11/09/20 : 122/80  09/17/20 : 116/82  07/16/20 : 134/84        Review of Systems   Constitutional: Negative for appetite change, chills, fever and unexpected weight change. HENT: Negative for ear pain, hearing loss, sore throat, tinnitus, trouble swallowing and voice change. Eyes: Negative for pain and visual disturbance. Respiratory: Negative for cough, chest tightness and shortness of breath. Cardiovascular: Negative for chest pain, palpitations and leg swelling. Gastrointestinal: Negative for abdominal distention, abdominal pain, blood in stool, constipation, diarrhea, nausea and vomiting. No gerd no dysphagia    Endocrine: Negative for polydipsia and polyuria. Genitourinary: Negative for difficulty urinating, dysuria and hematuria.    Musculoskeletal: Negative for back pain and neck pain. Skin: Negative for rash. No skin change    Neurological: Negative for dizziness, syncope and headaches. Hematological: Negative for adenopathy. Does not bruise/bleed easily. Objective:   Physical Exam  Constitutional:       General: He is not in acute distress. Appearance: Normal appearance. He is well-developed. He is not ill-appearing or diaphoretic. HENT:      Head: Normocephalic and atraumatic. Right Ear: Ear canal normal.      Left Ear: Ear canal normal.      Ears:      Comments: Wax amanda ear canals     Nose: Nose normal.      Mouth/Throat:      Lips: Pink. Mouth: Mucous membranes are moist. No oral lesions. Pharynx: Oropharynx is clear. Uvula midline. Eyes:      General: No scleral icterus. Neck:      Musculoskeletal: Neck supple. Thyroid: No thyroid mass or thyromegaly. Cardiovascular:      Rate and Rhythm: Normal rate and regular rhythm. Heart sounds: Normal heart sounds. No murmur. No friction rub. No gallop. Comments:   No edema legs  Pulmonary:      Effort: Pulmonary effort is normal. No tachypnea, accessory muscle usage or respiratory distress. Breath sounds: Normal breath sounds. No decreased breath sounds, wheezing, rhonchi or rales. Abdominal:      General: Bowel sounds are normal. There is no distension or abdominal bruit. Palpations: Abdomen is soft. There is no hepatomegaly, splenomegaly, mass or pulsatile mass. Tenderness: There is no abdominal tenderness. There is no right CVA tenderness, left CVA tenderness or guarding. Lymphadenopathy:      Head:      Right side of head: No submental, submandibular, preauricular or posterior auricular adenopathy. Left side of head: No submental, submandibular, preauricular or posterior auricular adenopathy. Cervical: No cervical adenopathy. Upper Body:      Right upper body: No supraclavicular adenopathy.       Left upper body: No supraclavicular adenopathy. Skin:     General: Skin is warm and dry. Coloration: Skin is not pale. Nails: There is no clubbing. Neurological:      General: No focal deficit present. Mental Status: He is alert. Psychiatric:         Attention and Perception: Attention normal.         Mood and Affect: Mood normal.         Speech: Speech normal.         Assessment:        Diagnosis Orders   1. Well adult exam  Lipid Panel    Comprehensive Metabolic Panel    Urinalysis with Microscopic   2. Needs flu shot     3. Essential hypertension  Lipid Panel    Comprehensive Metabolic Panel    Urinalysis with Microscopic       Seems to be well   The cough cleared completely since he changed the med   Colon done 10/30/17 dr Aminta Goldman. polyp and check in 5 years  He wants flu shot and no pb in past with them       Plan:      Continue the medicines  see in 6 months         Lucy Magdaleno MD

## 2020-11-09 NOTE — PROGRESS NOTES
Vaccine Information Sheet, \"Influenza - Inactivated\"  given to Maurizio Moreno, or parent/legal guardian of  Maurizio Moreno and verbalized understanding. Patient responses:    Have you ever had a reaction to a flu vaccine? No  Do you have any current illness? No  Have you ever had Guillian Montgomery Syndrome? No  Do you have a serious allergy to any of the follow: Neomycin, Polymyxin, Thimerosal, eggs or egg products? No    Flu vaccine given per order. Please see immunization tab. Risks and benefits explained. Current VIS given.

## 2020-11-16 RX ORDER — AMLODIPINE BESYLATE 2.5 MG/1
TABLET ORAL
Qty: 90 TABLET | Refills: 1 | Status: SHIPPED | OUTPATIENT
Start: 2020-11-16 | End: 2021-04-26

## 2020-12-01 ENCOUNTER — VIRTUAL VISIT (OUTPATIENT)
Dept: PULMONOLOGY | Age: 50
End: 2020-12-01
Payer: COMMERCIAL

## 2020-12-01 PROCEDURE — 99442 PR PHYS/QHP TELEPHONE EVALUATION 11-20 MIN: CPT | Performed by: NURSE PRACTITIONER

## 2020-12-01 ASSESSMENT — SLEEP AND FATIGUE QUESTIONNAIRES
HOW LIKELY ARE YOU TO NOD OFF OR FALL ASLEEP WHILE SITTING AND READING: 0
HOW LIKELY ARE YOU TO NOD OFF OR FALL ASLEEP WHEN YOU ARE A PASSENGER IN A CAR FOR AN HOUR WITHOUT A BREAK: 0
ESS TOTAL SCORE: 0
HOW LIKELY ARE YOU TO NOD OFF OR FALL ASLEEP WHILE SITTING INACTIVE IN A PUBLIC PLACE: 0
HOW LIKELY ARE YOU TO NOD OFF OR FALL ASLEEP WHILE SITTING QUIETLY AFTER LUNCH WITHOUT ALCOHOL: 0
HOW LIKELY ARE YOU TO NOD OFF OR FALL ASLEEP WHILE WATCHING TV: 0
HOW LIKELY ARE YOU TO NOD OFF OR FALL ASLEEP WHILE LYING DOWN TO REST IN THE AFTERNOON WHEN CIRCUMSTANCES PERMIT: 0
HOW LIKELY ARE YOU TO NOD OFF OR FALL ASLEEP WHILE SITTING AND TALKING TO SOMEONE: 0
HOW LIKELY ARE YOU TO NOD OFF OR FALL ASLEEP IN A CAR, WHILE STOPPED FOR A FEW MINUTES IN TRAFFIC: 0

## 2020-12-01 NOTE — PROGRESS NOTES
Stephany Motley MD, FAASM, [facility]  Gabby Smith, MSN, RN, CNP     [address]  SLEEP MEDICINE  [address] [address]  Dept: [phone]  Dept Fax: : [facility] [place] [facility]  [address] [place] [id number] [phone]    Subjective:     Patient ID: Maurizio Moreno is a 48 y.o. male. Chief Complaint   Patient presents with    Sleep Apnea       HPI:      Sleep Medicine Telephone Visit    Pursuant to the emergency declaration under the [facility] waiver authority and the [employer] Act this Telephone Visit was insisted, with patient's consent, to reduce the patient's risk of exposure to COVID-19 and provide continuity of care for an established patient. Services were provided through a synchronous discussion over a telephone and/or Video chat to substitute for in-person clinic visit, and coded as such. While patient is at home. Machine Modem/Download Info:                                              [place] - Total score: 0    Follow-up :     Last Visit : May 2020    Titration study done on 10/2/2020      Patient reports the listed chronic Co-morbidities: HTN   are well controlled and stable at this time. Subjective Health Changes: None      Over Night Oximetry: [] Yes  [] No  [x] NA [] WNL   Using O2: [] Yes  [] No  [x] NA   Patient is compliant with the machine  Per patient    Feeling rested when using the machine   [x] Yes  [] No     Pressure is comfortable with inspiration and expiration  [x] Yes  [] No     Noticed changes in pressure   [] Yes  [] No  [x] NA   Mask is fitting well  [x] Yes  [] No   Noting Mask Air Leak  [] Yes  [x] No   Having painful Aerophagia  [] Yes  [x] No   Nocturia   0  per night.    Having  HA upon waking  [] Yes  [x] No Dry mouth upon waking   Dry Nose  Dry Eyes  [x] Yes  [] No   Congestion upon waking   [] Yes  [x] No    Nose Bleeds  [] Yes  [x] No   Using Sleep Aides   Melatonin OTC   Understands how to change humidification and/or tubing temperature for comfort while at home  [x] Yes  [] No     Difficulties falling asleep  [] Yes  [x] No   Difficulties staying asleep  [] Yes  [x] No   Approximate time to bed  11pm   Approximate wake time  2-4am   Taking Naps  frequent   If taking naps usual length  2-5 hours per day   If taking naps using the machine  [] Yes  [] No  [x] NA [] With and With out    Drowsy when driving  [] Yes  [x] No     Does patient carry a DOT/CDL  [] Yes  [x] No     Does patient carry FAA/Pilots License   [] Yes  [x] No      Any concerns noted with the machine at this time  [] Yes  [x] No        Diagnosis Orders   1. Obstructive sleep apnea (adult) (pediatric)     2. Essential hypertension         The chronic medical conditions listed are directly related to the primary diagnosis listed above. The management of the primary diagnosis affects the secondary diagnosis and vice versa. Assessment/Plan:     Essential hypertension  Chronic- Stable. Cont meds per PCP and other physicians. Obstructive sleep apnea (adult) (pediatric)  Unable to Review compliance download with pt. Supplies and parts as needed for his machine. These are medically necessary. Continue medications per his PCP and other physicians. Limit caffeine use after 3pm.  Encouraged him to work on weight loss through diet and exercise. The encounter diagnosis was Essential hypertension. The chronic medical conditions listed are directly related to the primary diagnosis listed above. The management of the primary diagnosis affects the secondary diagnosis and vice versa. The primary encounter diagnosis was Obstructive sleep apnea (adult) (pediatric). A diagnosis of Essential hypertension was also pertinent to this visit.   The chronic medical conditions listed are directly related to the primary diagnosis listed above. The management of the primary diagnosis affects the secondary diagnosis and vice versa. - Educated patient and unable to review down load from modem with the patient   - Continue medications per his PCP and other physicians.   - he instructed not to drive unless had 4 hrs of effective therapy for his ROMA the night before. - Did review the risks of under or untreated ROMA including, but not limited to, higher risks of motor vehicle accidents, stroke, heart attacks, and death. - he understands and accepts all these risks. - The patient is advised to use the machine every night.   - Patient using  [address] [place] [address] for supplies  - Patient not able to access video feed. Visit completed via telephone communications. 25 min spent with patient.   - Educated the patient there may be a need to take the machine or card to the DME if they need the download as we are not able to access the modem and it is safer then our office with the COVID-19 pandemic  -F/U: 3 months      No orders of the defined types were placed in this encounter. No orders of the defined types were placed in this encounter. No orders of the defined types were placed in this encounter.       Gabby Smith, MSN, RN, CNP

## 2020-12-01 NOTE — ASSESSMENT & PLAN NOTE
Unable to Review compliance download with pt. Supplies and parts as needed for his machine. These are medically necessary. Continue medications per his PCP and other physicians. Limit caffeine use after 3pm.  Encouraged him to work on weight loss through diet and exercise. The encounter diagnosis was Essential hypertension. The chronic medical conditions listed are directly related to the primary diagnosis listed above. The management of the primary diagnosis affects the secondary diagnosis and vice versa.

## 2020-12-29 DIAGNOSIS — I10 ESSENTIAL HYPERTENSION: Chronic | ICD-10-CM

## 2020-12-29 DIAGNOSIS — Z00.00 WELL ADULT EXAM: ICD-10-CM

## 2020-12-29 LAB
A/G RATIO: 1.7 (ref 1.1–2.2)
ALBUMIN SERPL-MCNC: 4.3 G/DL (ref 3.4–5)
ALP BLD-CCNC: 96 U/L (ref 40–129)
ALT SERPL-CCNC: 18 U/L (ref 10–40)
ANION GAP SERPL CALCULATED.3IONS-SCNC: 16 MMOL/L (ref 3–16)
AST SERPL-CCNC: 19 U/L (ref 15–37)
BILIRUB SERPL-MCNC: 0.4 MG/DL (ref 0–1)
BILIRUBIN URINE: NEGATIVE
BLOOD, URINE: NEGATIVE
BUN BLDV-MCNC: 21 MG/DL (ref 7–20)
CALCIUM SERPL-MCNC: 9.3 MG/DL (ref 8.3–10.6)
CHLORIDE BLD-SCNC: 107 MMOL/L (ref 99–110)
CHOLESTEROL, TOTAL: 199 MG/DL (ref 0–199)
CLARITY: CLEAR
CO2: 20 MMOL/L (ref 21–32)
COLOR: YELLOW
CREAT SERPL-MCNC: 0.9 MG/DL (ref 0.9–1.3)
EPITHELIAL CELLS, UA: 0 /HPF (ref 0–5)
GFR AFRICAN AMERICAN: >60
GFR NON-AFRICAN AMERICAN: >60
GLOBULIN: 2.5 G/DL
GLUCOSE BLD-MCNC: 96 MG/DL (ref 70–99)
GLUCOSE URINE: NEGATIVE MG/DL
HDLC SERPL-MCNC: 43 MG/DL (ref 40–60)
HYALINE CASTS: 0 /LPF (ref 0–8)
KETONES, URINE: NEGATIVE MG/DL
LDL CHOLESTEROL CALCULATED: 141 MG/DL
LEUKOCYTE ESTERASE, URINE: NEGATIVE
MICROSCOPIC EXAMINATION: NORMAL
NITRITE, URINE: NEGATIVE
PH UA: 6 (ref 5–8)
POTASSIUM SERPL-SCNC: 4.1 MMOL/L (ref 3.5–5.1)
PROTEIN UA: NEGATIVE MG/DL
RBC UA: 1 /HPF (ref 0–4)
SODIUM BLD-SCNC: 143 MMOL/L (ref 136–145)
SPECIFIC GRAVITY UA: 1.02 (ref 1–1.03)
TOTAL PROTEIN: 6.8 G/DL (ref 6.4–8.2)
TRIGL SERPL-MCNC: 73 MG/DL (ref 0–150)
URINE TYPE: NORMAL
UROBILINOGEN, URINE: 0.2 E.U./DL
VLDLC SERPL CALC-MCNC: 15 MG/DL
WBC UA: 0 /HPF (ref 0–5)

## 2021-01-05 ENCOUNTER — TELEPHONE (OUTPATIENT)
Dept: PULMONOLOGY | Age: 51
End: 2021-01-05

## 2021-01-11 ENCOUNTER — VIRTUAL VISIT (OUTPATIENT)
Dept: PULMONOLOGY | Age: 51
End: 2021-01-11
Payer: COMMERCIAL

## 2021-01-11 DIAGNOSIS — I10 ESSENTIAL HYPERTENSION: Chronic | ICD-10-CM

## 2021-01-11 DIAGNOSIS — G47.33 OBSTRUCTIVE SLEEP APNEA (ADULT) (PEDIATRIC): Primary | ICD-10-CM

## 2021-01-11 PROCEDURE — 99214 OFFICE O/P EST MOD 30 MIN: CPT | Performed by: NURSE PRACTITIONER

## 2021-01-11 ASSESSMENT — SLEEP AND FATIGUE QUESTIONNAIRES
HOW LIKELY ARE YOU TO NOD OFF OR FALL ASLEEP WHILE LYING DOWN TO REST IN THE AFTERNOON WHEN CIRCUMSTANCES PERMIT: 2
HOW LIKELY ARE YOU TO NOD OFF OR FALL ASLEEP WHILE SITTING AND READING: 2
HOW LIKELY ARE YOU TO NOD OFF OR FALL ASLEEP WHILE SITTING INACTIVE IN A PUBLIC PLACE: 0
HOW LIKELY ARE YOU TO NOD OFF OR FALL ASLEEP WHILE SITTING AND TALKING TO SOMEONE: 0

## 2021-01-11 NOTE — PROGRESS NOTES
[place] WILLS, FAASM, [facility]  Jerod Beverly, MSN, RN, CNP     [address]  SLEEP MEDICINE  [address] [address]  Dept: [phone]  Dept Fax: : [phone] [facility]  [address] [place] [id number] [phone]    Subjective:     Patient ID: Alysia Longoria is a 48 y.o. male. Chief Complaint   Patient presents with    Sleep Apnea       HPI:      Sleep Medicine Video Visit    Pursuant to the emergency declaration under the [facility] waiver authority and the [employer] Act this Telephone Visit was insisted, with patient's consent, to reduce the patient's risk of exposure to COVID-19 and provide continuity of care for an established patient. Services were provided through a synchronous discussion over a telephone and/or Video chat to substitute for in-person clinic visit, and coded as such. While patient is at home. Machine Modem/Download Info:  Compliance (hours/night): 1.6 hrs/night  Download AHI (/hour): 9.8 /HR     Average IPAP Pressure: 15.7 cmH2O  Average EPAP Pressure: 12.4 cmH2O         AUTO BIPAP - Settings ([place])  IPAP Max: 25 cmH2O  EPAP Min: 11 cmH2O  Pressure Support Min: 1  Pressure Support Max: 3             Comfort Settings  Humidity Level (0-8): 1  Flex/EPR (0-3): 3 PAP Mask  Mask Type: Full Face mask  Clinically Relevant Leak: No     [place] - Total score: 6    Follow-up :     Last Visit : December 2020      Patient reports the listed chronic Co-morbidities: HTN   are well controlled and stable at this time.      Subjective Health Changes: None      Over Night Oximetry: [] Yes  [] No  [x] NA [] WNL   Using O2: [] Yes  [] No  [x] NA   Patient is compliant with the machine  [] Yes  [x] No Feeling rested when using the machine   [x] Yes  [] No     Pressure is comfortable with inspiration and expiration  [x] Yes  [] No     Noticed changes in pressure   [] Yes  [] No  [x] NA   Mask is fitting well  [x] Yes  [] No   Noting Mask Air Leak  [] Yes  [x] No   Having painful Aerophagia  [] Yes  [x] No   Nocturia   1-2  per night. Having  HA upon waking  [] Yes  [x] No   Dry mouth upon waking   Dry Nose  Dry Eyes  [x] Yes  [] No   Congestion upon waking   [] Yes  [x] No    Nose Bleeds  [] Yes  [x] No   Using Sleep Aides    [x] NA   Understands how to change humidification and/or tubing temperature for comfort while at home  [x] Yes  [] No     Difficulties falling asleep  [] Yes  [x] No   Difficulties staying asleep  [] Yes  [x] No   Approximate time to bed  11:30pm   Approximate wake time  8am   Taking Naps  no   If taking naps usual length    [x] NA   If taking naps using the machine  [] Yes  [] No  [x] NA [] With and With out    Drowsy when driving  [] Yes  [x] No     Does patient carry a DOT/CDL  [] Yes  [x] No     Does patient carry FAA/[employer] License   [] Yes  [x] No      Any concerns noted with the machine at this time  [] Yes  [x] No        Diagnosis Orders   1. Obstructive sleep apnea (adult) (pediatric)     2. Essential hypertension         The chronic medical conditions listed are directly related to the primary diagnosis listed above. The management of the primary diagnosis affects the secondary diagnosis and vice versa. Assessment/Plan:     Essential hypertension  Chronic- Stable. Cont meds per PCP and other physicians.       Obstructive sleep apnea (adult) (pediatric) Reviewed compliance download with pt. Supplies and parts as needed for his machine. These are medically necessary. Continue medications per his PCP and other physicians. Limit caffeine use after 3pm.  Encouraged him to work on weight loss through diet and exercise. The encounter diagnosis was Essential hypertension. The chronic medical conditions listed are directly related to the primary diagnosis listed above. The management of the primary diagnosis affects the secondary diagnosis and vice versa. The primary encounter diagnosis was Obstructive sleep apnea (adult) (pediatric). A diagnosis of Essential hypertension was also pertinent to this visit. The chronic medical conditions listed are directly related to the primary diagnosis listed above. The management of the primary diagnosis affects the secondary diagnosis and vice versa. - Educated patient and reviewed down load from modem with the patient   - Continue medications per his PCP and other physicians.   - he instructed not to drive unless had 4 hrs of effective therapy for his ROMA the night before. - Did review the risks of under or untreated ROMA including, but not limited to, higher risks of motor vehicle accidents, stroke, heart attacks, and death. - he understands and accepts all these risks. - The patient is advised to use the machine every night.   - Patient using  [employer] for supplies  - - Patient able to access video feed. Visit completed via video chat communications. 25 min spent with patient.   - Educated on Inspire, Oral appliance, and compliance  -F/U: 3 months      No orders of the defined types were placed in this encounter. No orders of the defined types were placed in this encounter. No orders of the defined types were placed in this encounter.       Bayron Yip, MSN, RN, CNP

## 2021-03-02 ENCOUNTER — TELEPHONE (OUTPATIENT)
Dept: PULMONOLOGY | Age: 51
End: 2021-03-02

## 2021-03-02 NOTE — TELEPHONE ENCOUNTER
Patient returning machine to DME Co. Being charged more for the machine then he was told and machine not helping him at all either.

## 2021-04-26 ENCOUNTER — OFFICE VISIT (OUTPATIENT)
Dept: FAMILY MEDICINE CLINIC | Age: 51
End: 2021-04-26
Payer: COMMERCIAL

## 2021-04-26 VITALS
SYSTOLIC BLOOD PRESSURE: 128 MMHG | TEMPERATURE: 97.2 F | BODY MASS INDEX: 27.55 KG/M2 | HEART RATE: 88 BPM | DIASTOLIC BLOOD PRESSURE: 84 MMHG | WEIGHT: 186 LBS | HEIGHT: 69 IN

## 2021-04-26 DIAGNOSIS — I10 ESSENTIAL HYPERTENSION: Primary | Chronic | ICD-10-CM

## 2021-04-26 PROCEDURE — 99213 OFFICE O/P EST LOW 20 MIN: CPT | Performed by: FAMILY MEDICINE

## 2021-04-26 RX ORDER — AMLODIPINE BESYLATE 5 MG/1
5 TABLET ORAL DAILY
Qty: 90 TABLET | Refills: 1 | Status: SHIPPED | OUTPATIENT
Start: 2021-04-26

## 2021-04-26 ASSESSMENT — ENCOUNTER SYMPTOMS
ABDOMINAL PAIN: 0
BLOOD IN STOOL: 0
NAUSEA: 0
CHEST TIGHTNESS: 0
ABDOMINAL DISTENTION: 0
CONSTIPATION: 0
VOMITING: 0
SHORTNESS OF BREATH: 0
DIARRHEA: 0

## 2021-04-26 ASSESSMENT — PATIENT HEALTH QUESTIONNAIRE - PHQ9
SUM OF ALL RESPONSES TO PHQ QUESTIONS 1-9: 0
SUM OF ALL RESPONSES TO PHQ QUESTIONS 1-9: 0
2. FEELING DOWN, DEPRESSED OR HOPELESS: 0
SUM OF ALL RESPONSES TO PHQ QUESTIONS 1-9: 0

## 2021-04-26 NOTE — PROGRESS NOTES
Subjective:      Patient ID: Adrian Sun is a 46 y.o. male. Chief Complaint   Patient presents with    6 Month Follow-Up     hypertension, lipids        Patient presents with:  6 Month Follow-Up: hypertension, lipids    Here for the above  He check the bp at home  120's over 80s at home   Pulse  In the 90's     Since off the ACE he has had resolution of the cough    meds the same    YOB: 1970    Date of Visit:  4/26/2021    No Known Allergies    Current Outpatient Medications:  amLODIPine (NORVASC) 2.5 MG tablet, TAKE 1 TABLET BY MOUTH EVERY DAY, Disp: 90 tablet, Rfl: 1    No current facility-administered medications for this visit.       ------------------------------------------               04/26/21 04/26/21                    1640            1653       ------------------------------------------   BP:          124/82          128/84       Site:    Left Upper Arm  Left Upper Arm   Position:     Sitting                      Cuff Size:   Large Adult    Large Adult     Pulse:                         88         Temp:   97.2 °F (36.2 °C)                 TempSrc:    Temporal                      Weight: 186 lb (84.4 kg)                  Height:  5' 9\" (1.753 m)                 ------------------------------------------  Body mass index is 27.47 kg/m². Wt Readings from Last 3 Encounters:  04/26/21 : 186 lb (84.4 kg)  11/09/20 : 183 lb (83 kg)  09/17/20 : 180 lb (81.6 kg)    BP Readings from Last 3 Encounters:  04/26/21 : 128/84  11/09/20 : 122/80  09/17/20 : 116/82                        Review of Systems   Constitutional: Negative for appetite change, chills, fever and unexpected weight change. Respiratory: Negative for chest tightness and shortness of breath. Cardiovascular: Negative for chest pain, palpitations and leg swelling.    Gastrointestinal: Negative for abdominal distention, abdominal pain, blood in stool, constipation, diarrhea, nausea and vomiting. Genitourinary: Negative for difficulty urinating. Neurological: Negative for headaches. Objective:   Physical Exam  Constitutional:       General: He is not in acute distress. Appearance: Normal appearance. He is well-developed. He is not ill-appearing or diaphoretic. HENT:      Head: Normocephalic and atraumatic. Neck:      Musculoskeletal: Neck supple. Thyroid: No thyroid mass or thyromegaly. Cardiovascular:      Rate and Rhythm: Normal rate and regular rhythm. Heart sounds: Normal heart sounds. No murmur. No friction rub. No gallop. Pulmonary:      Effort: Pulmonary effort is normal. No tachypnea, accessory muscle usage or respiratory distress. Breath sounds: Normal breath sounds. No decreased breath sounds, wheezing, rhonchi or rales. Lymphadenopathy:      Cervical: No cervical adenopathy. Upper Body:      Right upper body: No supraclavicular adenopathy. Left upper body: No supraclavicular adenopathy. Skin:     General: Skin is warm and dry. Coloration: Skin is not pale. Neurological:      Mental Status: He is alert. Assessment:        Diagnosis Orders   1.  Essential hypertension       He has concerns that the bp was better with the lisinopril   Since visit with us he had a visit with the pulmonary offices for the [facility] and reviewed    Orders Placed This Encounter   Medications    amLODIPine (NORVASC) 5 MG tablet     Sig: Take 1 tablet by mouth daily     Dispense:  90 tablet     Refill:  1     New dose           Plan:      Increase the dose of the medicine to 5 mg a day   Call me the numbers in 2-4 weeks  See us back for a wellness check in 6 months   Do stay with a low fat diet         [place] Jay MD

## 2021-05-11 ENCOUNTER — TELEPHONE (OUTPATIENT)
Dept: FAMILY MEDICINE CLINIC | Age: 51
End: 2021-05-11

## 2021-05-11 NOTE — TELEPHONE ENCOUNTER
Pt stated his bp med as been changed about 2 weeks ago, pt's bp is still running high     Averaging   Low 120's / over 80 it was under 80 once in the 2 week period   75-80 pulse.     Pl advise   [phone]

## 2021-05-14 ENCOUNTER — NURSE ONLY (OUTPATIENT)
Dept: FAMILY MEDICINE CLINIC | Age: 51
End: 2021-05-14

## 2021-05-14 VITALS — DIASTOLIC BLOOD PRESSURE: 82 MMHG | TEMPERATURE: 97.9 F | SYSTOLIC BLOOD PRESSURE: 120 MMHG

## 2021-05-14 DIAGNOSIS — I10 ESSENTIAL HYPERTENSION: Primary | ICD-10-CM

## 2021-05-14 PROCEDURE — 99999 PR OFFICE/OUTPT VISIT,PROCEDURE ONLY: CPT | Performed by: FAMILY MEDICINE

## 2021-05-14 PROCEDURE — 2000F BLOOD PRESSURE MEASURE: CPT | Performed by: FAMILY MEDICINE

## 2021-05-14 NOTE — PROGRESS NOTES
He is concerned with the bottom numbers averaging in the mid 80s at home. He states Dr. Geoff Cornejo always wanted his numbers to be lower than 80. He states he was taking Lisinopril and his numbers were great. He wanted Lisinopril changed because of his cough and COVID. He states it was during the start of COVID and everyone was looking at him if he coughed. Please advise.

## 2021-06-09 ENCOUNTER — TELEPHONE (OUTPATIENT)
Dept: FAMILY MEDICINE CLINIC | Age: 51
End: 2021-06-09

## 2021-06-09 NOTE — TELEPHONE ENCOUNTER
Patient said he had to come in a few weeks ago just for blood pressure check and he never heard back if his numbers were good. He states his blood pressure has been running 120/80 to high 80's, 119/84 pulse 75,  feels like his heart is racing at times. Iesha Talavera He feels fine other wise. He was on Norvasc prior but changed due to cough.       Please call,  [phone]

## 2021-06-10 NOTE — TELEPHONE ENCOUNTER
His numbers were fine  He was on lisinopril before and stopped due to cough and now on norvasc  He is welcome to see us next week to check

## 2024-04-08 NOTE — RESULT ENCOUNTER NOTE
Your test for COVID-19, also known as novel coronavirus, came back negative/not detected. No virus was detected from the sample collected. Testing is not 100%. Until your symptoms are fully resolved, you may still be contagious. We recommend that you remain isolated for 7 days minimum, or 24-48 hours after your symptoms have completely resolved, whichever is longer. If you were exposed to a known positive COVID-19 patient, then you must remain quarantined for 14 days. If you were tested for an upcoming procedure, then you should remain in quarantine until your procedure. Continually monitor symptoms. Contact a medical provider if symptoms are worsening. If you have any additional questions, contact your PCP.     For additional information, please visit the Centers for Disease Control and Prevention [email or web address]
No indicators present

## 2025-08-30 ENCOUNTER — APPOINTMENT (OUTPATIENT)
Dept: GENERAL RADIOLOGY | Age: 55
End: 2025-08-30
Payer: COMMERCIAL

## 2025-08-30 ENCOUNTER — HOSPITAL ENCOUNTER (EMERGENCY)
Age: 55
Discharge: HOME | End: 2025-08-30
Payer: COMMERCIAL

## 2025-08-30 VITALS
TEMPERATURE: 98.4 F | WEIGHT: 175 LBS | BODY MASS INDEX: 25.92 KG/M2 | DIASTOLIC BLOOD PRESSURE: 88 MMHG | OXYGEN SATURATION: 94 % | HEIGHT: 69 IN | SYSTOLIC BLOOD PRESSURE: 161 MMHG | HEART RATE: 89 BPM | RESPIRATION RATE: 19 BRPM

## 2025-08-30 DIAGNOSIS — R07.9 CHEST PAIN, UNSPECIFIED TYPE: Primary | ICD-10-CM

## 2025-08-30 LAB
ALBUMIN SERPL-MCNC: 3.8 G/DL (ref 3.4–5)
ALBUMIN/GLOB SERPL: 1.4 {RATIO} (ref 1.1–2.2)
ALP SERPL-CCNC: 94 U/L (ref 40–129)
ALT SERPL-CCNC: 15 U/L (ref 10–40)
ANION GAP SERPL CALCULATED.3IONS-SCNC: 11 MMOL/L (ref 3–16)
APTT BLD: 25.3 SEC (ref 22.8–35.8)
AST SERPL-CCNC: 24 U/L (ref 15–37)
BASOPHILS # BLD: 0 K/UL (ref 0–0.2)
BASOPHILS NFR BLD: 0.1 %
BILIRUB SERPL-MCNC: 0.3 MG/DL (ref 0–1)
BUN SERPL-MCNC: 22 MG/DL (ref 7–20)
CALCIUM SERPL-MCNC: 9.1 MG/DL (ref 8.3–10.6)
CHLORIDE SERPL-SCNC: 103 MMOL/L (ref 99–110)
CO2 SERPL-SCNC: 23 MMOL/L (ref 21–32)
CREAT SERPL-MCNC: 1.4 MG/DL (ref 0.9–1.3)
D-DIMER QUANTITATIVE: <0.27 UG/ML FEU (ref 0–0.6)
DEPRECATED RDW RBC AUTO: 13.9 % (ref 12.4–15.4)
EOSINOPHIL # BLD: 0.4 K/UL (ref 0–0.6)
EOSINOPHIL NFR BLD: 7.1 %
GFR SERPLBLD CREATININE-BSD FMLA CKD-EPI: 59 ML/MIN/{1.73_M2}
GLUCOSE SERPL-MCNC: 123 MG/DL (ref 70–99)
HCT VFR BLD AUTO: 46.7 % (ref 40.5–52.5)
HGB BLD-MCNC: 16.1 G/DL (ref 13.5–17.5)
INR PPP: 0.89 (ref 0.86–1.14)
LYMPHOCYTES # BLD: 1.6 K/UL (ref 1–5.1)
LYMPHOCYTES NFR BLD: 29.9 %
MCH RBC QN AUTO: 30.7 PG (ref 26–34)
MCHC RBC AUTO-ENTMCNC: 34.5 G/DL (ref 31–36)
MCV RBC AUTO: 89 FL (ref 80–100)
MONOCYTES # BLD: 0.5 K/UL (ref 0–1.3)
MONOCYTES NFR BLD: 9.8 %
NEUTROPHILS # BLD: 2.9 K/UL (ref 1.7–7.7)
NEUTROPHILS NFR BLD: 53.1 %
NT-PROBNP SERPL-MCNC: 44 PG/ML (ref 0–124)
PLATELET # BLD AUTO: 239 K/UL (ref 135–450)
PMV BLD AUTO: 7.8 FL (ref 5–10.5)
POTASSIUM SERPL-SCNC: 4.1 MMOL/L (ref 3.5–5.1)
PROT SERPL-MCNC: 6.5 G/DL (ref 6.4–8.2)
PROTHROMBIN TIME: 12.4 SEC (ref 12.1–14.9)
RBC # BLD AUTO: 5.25 M/UL (ref 4.2–5.9)
SODIUM SERPL-SCNC: 137 MMOL/L (ref 136–145)
TROPONIN, HIGH SENSITIVITY: <6 NG/L (ref 0–22)
TROPONIN, HIGH SENSITIVITY: <6 NG/L (ref 0–22)
TSH SERPL DL<=0.005 MIU/L-ACNC: 1.66 UIU/ML (ref 0.27–4.2)
WBC # BLD AUTO: 5.4 K/UL (ref 4–11)

## 2025-08-30 PROCEDURE — 99285 EMERGENCY DEPT VISIT HI MDM: CPT

## 2025-08-30 PROCEDURE — 85379 FIBRIN DEGRADATION QUANT: CPT

## 2025-08-30 PROCEDURE — 85610 PROTHROMBIN TIME: CPT

## 2025-08-30 PROCEDURE — 84443 ASSAY THYROID STIM HORMONE: CPT

## 2025-08-30 PROCEDURE — 83880 ASSAY OF NATRIURETIC PEPTIDE: CPT

## 2025-08-30 PROCEDURE — 71045 X-RAY EXAM CHEST 1 VIEW: CPT

## 2025-08-30 PROCEDURE — 80053 COMPREHEN METABOLIC PANEL: CPT

## 2025-08-30 PROCEDURE — 85025 COMPLETE CBC W/AUTO DIFF WBC: CPT

## 2025-08-30 PROCEDURE — 84484 ASSAY OF TROPONIN QUANT: CPT

## 2025-08-30 PROCEDURE — 36415 COLL VENOUS BLD VENIPUNCTURE: CPT

## 2025-08-30 PROCEDURE — 93005 ELECTROCARDIOGRAM TRACING: CPT | Performed by: PHYSICIAN ASSISTANT

## 2025-08-30 PROCEDURE — 85730 THROMBOPLASTIN TIME PARTIAL: CPT

## 2025-08-30 RX ORDER — LOSARTAN POTASSIUM 100 MG/1
100 TABLET ORAL DAILY
COMMUNITY

## 2025-08-30 ASSESSMENT — ENCOUNTER SYMPTOMS
SHORTNESS OF BREATH: 1
COUGH: 1
CHEST TIGHTNESS: 0
NAUSEA: 0
DIARRHEA: 0
ABDOMINAL PAIN: 0
VOMITING: 0

## 2025-08-30 ASSESSMENT — LIFESTYLE VARIABLES
HOW OFTEN DO YOU HAVE A DRINK CONTAINING ALCOHOL: NEVER
HOW MANY STANDARD DRINKS CONTAINING ALCOHOL DO YOU HAVE ON A TYPICAL DAY: PATIENT DOES NOT DRINK

## 2025-08-30 ASSESSMENT — HEART SCORE: ECG: NORMAL

## 2025-08-31 LAB
EKG ATRIAL RATE: 102 BPM
EKG DIAGNOSIS: NORMAL
EKG P AXIS: 53 DEGREES
EKG P-R INTERVAL: 176 MS
EKG Q-T INTERVAL: 346 MS
EKG QRS DURATION: 90 MS
EKG QTC CALCULATION (BAZETT): 450 MS
EKG R AXIS: -27 DEGREES
EKG T AXIS: 52 DEGREES
EKG VENTRICULAR RATE: 102 BPM

## 2025-08-31 PROCEDURE — 93010 ELECTROCARDIOGRAM REPORT: CPT | Performed by: INTERNAL MEDICINE
